# Patient Record
Sex: MALE | Race: WHITE | URBAN - METROPOLITAN AREA
[De-identification: names, ages, dates, MRNs, and addresses within clinical notes are randomized per-mention and may not be internally consistent; named-entity substitution may affect disease eponyms.]

---

## 2017-11-11 ENCOUNTER — OFFICE VISIT (OUTPATIENT)
Dept: URGENT CARE | Facility: URGENT CARE | Age: 72
End: 2017-11-11
Payer: MEDICARE

## 2017-11-11 VITALS
HEART RATE: 60 BPM | SYSTOLIC BLOOD PRESSURE: 110 MMHG | WEIGHT: 164 LBS | TEMPERATURE: 98.6 F | DIASTOLIC BLOOD PRESSURE: 65 MMHG

## 2017-11-11 DIAGNOSIS — H61.23 BILATERAL IMPACTED CERUMEN: Primary | ICD-10-CM

## 2017-11-11 PROBLEM — R56.9: Status: ACTIVE | Noted: 2017-11-11

## 2017-11-11 PROCEDURE — 99203 OFFICE O/P NEW LOW 30 MIN: CPT | Performed by: PHYSICIAN ASSISTANT

## 2017-11-11 RX ORDER — TRIAMCINOLONE ACETONIDE 1 MG/G
CREAM TOPICAL 2 TIMES DAILY
COMMUNITY

## 2017-11-11 RX ORDER — HYDROXYZINE HYDROCHLORIDE 25 MG/1
25 TABLET, FILM COATED ORAL 3 TIMES DAILY PRN
COMMUNITY

## 2017-11-11 RX ORDER — LORATADINE 10 MG/1
10 TABLET ORAL DAILY
COMMUNITY

## 2017-11-11 RX ORDER — OMEPRAZOLE/SODIUM BICARBONATE 20MG-1.1G
20 CAPSULE ORAL DAILY
COMMUNITY

## 2017-11-11 ASSESSMENT — ENCOUNTER SYMPTOMS
RESPIRATORY NEGATIVE: 1
WEIGHT LOSS: 0
GASTROINTESTINAL NEGATIVE: 1
COUGH: 0
PALPITATIONS: 0
HEMOPTYSIS: 0
DIAPHORESIS: 0
FEVER: 0
CONSTITUTIONAL NEGATIVE: 1
EYE PAIN: 0
CARDIOVASCULAR NEGATIVE: 1

## 2017-11-11 NOTE — PROGRESS NOTES
SUBJECTIVE:                                                       HPI  Alexi Becker is a 72 year old male who presents today with plugged R>L, ears since yesterday.  Also accompanied by sister today as well who provides much of the history.  Has noticed decreased hearing along with plugged sensation in the ears, R>L since yesterday.  No pain, drainage, or swelling.  No recent URI or fevers.  No recent swimming.  No HA, dizziness or ringing in the ears.  They have not tried much for his ears.  Sister reports hx of earwax buildup.  No foreign body insertion.     Reviewed PMH.  Patient Active Problem List   Diagnosis     Bilateral impacted cerumen     Convulsion disorder (H)       Current Outpatient Prescriptions   Medication Sig Dispense Refill     hydrOXYzine (ATARAX) 25 MG tablet Take 25 mg by mouth 3 times daily as needed for itching       loratadine (CLARITIN) 10 MG tablet Take 10 mg by mouth daily       MELATONIN PO Take 1 mg by mouth At Bedtime       omeprazole-sodium bicarbonate  MG CAPS per capsule Take 20 mg by mouth daily       triamcinolone (KENALOG) 0.1 % cream Apply topically 2 times daily       carbamide peroxide (DEBROX) 6.5 % otic solution 5 drops 2 times daily       LevETIRAcetam (KEPPRA PO) Take 1,000 mg by mouth daily       No Known Allergies    Review of Systems   Constitutional: Negative.  Negative for diaphoresis, fever and weight loss.   HENT: Positive for hearing loss. Negative for ear discharge and ear pain.    Eyes: Negative for pain.   Respiratory: Negative.  Negative for cough and hemoptysis.    Cardiovascular: Negative.  Negative for chest pain and palpitations.   Gastrointestinal: Negative.    Skin: Negative.    Endo/Heme/Allergies: Negative for environmental allergies.   All other systems reviewed and are negative.      /65  Pulse 60  Temp 98.6  F (37  C) (Oral)  Wt 164 lb (74.4 kg)  Physical Exam   Constitutional: He is oriented to person, place, and time and  well-developed, well-nourished, and in no distress. No distress.   HENT:   Head: Normocephalic and atraumatic.   Right Ear: Hearing, external ear and ear canal normal.   Left Ear: Hearing, external ear and ear canal normal.   Nose: Nose normal.   Mouth/Throat: Uvula is midline, oropharynx is clear and moist and mucous membranes are normal. No oropharyngeal exudate or posterior oropharyngeal erythema.   Bilateral cerumen impaction.  TMs are intact without any erythema or bulging bilaterally after successful ear lavage.  Airway is patent.   Eyes: Conjunctivae and EOM are normal. Pupils are equal, round, and reactive to light. No scleral icterus.   Neck: Normal range of motion. Neck supple. No thyromegaly present.   Cardiovascular: Normal rate, regular rhythm, normal heart sounds and intact distal pulses.  Exam reveals no gallop and no friction rub.    No murmur heard.  Pulmonary/Chest: Effort normal and breath sounds normal. No respiratory distress. He has no wheezes. He has no rales.   Lymphadenopathy:     He has no cervical adenopathy.   Neurological: He is alert and oriented to person, place, and time.   Skin: Skin is warm and dry. No rash noted.   Psychiatric: Mood and affect normal.   Nursing note and vitals reviewed.        Assessment/Plan:  Bilateral impacted cerumen:  This was successfully irrigated in clinic with good improvement.  No evidence of infection at this time.  Recommend OTC debrox for earwax buildup.  Avoid foreign body insertion.  Tylenol/ibuprofen as needed for pain.  Recheck in clinic if symptoms worsen or if symptoms do not improve.  -     REMOVE IMPACTED CERUMEN          Angie See IRA Amanda

## 2017-11-11 NOTE — MR AVS SNAPSHOT
"              After Visit Summary   2017    Alexi Becker    MRN: [id number]           Patient Information     Date Of Birth          1945        Visit Information        Provider Department      2017 12:35 PM Angie Amanda PA-C [facility]        Today's Diagnoses     Bilateral impacted cerumen    -  1       Follow-ups after your visit        Who to contact     If you have questions or need follow up information about today's clinic visit or your schedule please contact [facility] directly at [phone].  Normal or non-critical lab and imaging results will be communicated to you by [employer], letter or phone within 4 business days after the clinic has received the results. If you do not hear from us within 7 days, please contact the clinic through [employer] or phone. If you have a critical or abnormal lab result, we will notify you by phone as soon as possible.  Submit refill requests through [employer] or call your pharmacy and they will forward the refill request to us. Please allow 3 business days for your refill to be completed.          Additional Information About Your Visit        MyChart Information     [employer] lets you send messages to your doctor, view your test results, renew your prescriptions, schedule appointments and more. To sign up, go to [email or web address] . Click on \"Log in\" on the left side of the screen, which will take you to the Welcome page. Then click on \"Sign up Now\" on the right side of the page.     You will be asked to enter the access code listed below, as well as some personal information. Please follow the directions to create your username and password.     Your access code is: [email or web address]  Expires: 2018  1:23 PM     Your access code will  in 90 days. If you need help or a new code, please call your [facility] or [phone].        Care EveryWhere ID     This is your Care EveryWhere ID. This could be used by other " organizations to access your [facility] medical records  [id number]        Your Vitals Were     Pulse Temperature                60 98.6  F (37  C) (Oral)           Blood Pressure from Last 3 Encounters:   11/11/17 110/65    Weight from Last 3 Encounters:   11/11/17 164 lb (74.4 kg)              We Performed the Following     REMOVE IMPACTED CERUMEN        Primary Care Provider Office Phone # Fax #    [facility]andra [facility] [phone] [facility] [address]        Equal Access to Services     ROSALINA GRIER : Hadii aad ku hadasho Soomaali, waaxda luqadaha, qaybta kaalmada adeegyada, waxay rocioin hayguille jon . So [facility] [phone].    ATENCIÓN: Si habla español, tiene a mcdonnell disposición servicios gratuitos de asistencia lingüística. Llame al [phone].    We comply with applicable federal civil rights laws and [place] laws. We do not discriminate on the basis of race, color, national origin, age, disability, sex, sexual orientation, or gender identity.            Thank you!     Thank you for choosing [facility]  for your care. Our goal is always to provide you with excellent care. Hearing back from our patients is one way we can continue to improve our services. Please take a few minutes to complete the written survey that you may receive in the mail after your visit with us. Thank you!             Your Updated Medication List - Protect others around you: Learn how to safely use, store and throw away your medicines at www.disposemymeds.org.          This list is accurate as of: 11/11/17  1:23 PM.  Always use your most recent med list.                   Brand Name Dispense Instructions for use Diagnosis    carbamide peroxide 6.5 % otic solution    DEBROX     5 drops 2 times daily        hydrOXYzine 25 MG tablet    ATARAX     Take 25 mg by mouth 3 times daily as needed for itching        KEPPRA PO      Take 1,000 mg by mouth daily         loratadine 10 MG tablet    CLARITIN     Take 10 mg by mouth daily        MELATONIN PO      Take 1 mg by mouth At Bedtime        omeprazole-sodium bicarbonate  MG Caps per capsule      Take 20 mg by mouth daily        triamcinolone 0.1 % cream    KENALOG     Apply topically 2 times daily

## 2017-11-11 NOTE — NURSING NOTE
Chief Complaint   Patient presents with     Ear Problem     Right ear plugged        Initial /65  Pulse 60  Temp 98.6  F (37  C) (Oral)  Wt 164 lb (74.4 kg) There is no height or weight on file to calculate BMI..  BP completed using cuff size: TAMEKA Arora

## 2019-05-14 ENCOUNTER — OFFICE VISIT (OUTPATIENT)
Dept: PODIATRY | Facility: CLINIC | Age: 74
End: 2019-05-14
Payer: MEDICARE

## 2019-05-14 VITALS — HEART RATE: 66 BPM | DIASTOLIC BLOOD PRESSURE: 72 MMHG | WEIGHT: 200 LBS | SYSTOLIC BLOOD PRESSURE: 112 MMHG

## 2019-05-14 DIAGNOSIS — M21.6X1 PRONATION OF BOTH FEET: Primary | ICD-10-CM

## 2019-05-14 DIAGNOSIS — M79.672 PAIN IN BOTH FEET: ICD-10-CM

## 2019-05-14 DIAGNOSIS — B35.1 ONYCHOMYCOSIS: ICD-10-CM

## 2019-05-14 DIAGNOSIS — M79.671 PAIN IN BOTH FEET: ICD-10-CM

## 2019-05-14 DIAGNOSIS — M21.6X2 PRONATION OF BOTH FEET: Primary | ICD-10-CM

## 2019-05-14 PROBLEM — R76.11 PPD POSITIVE: Status: ACTIVE | Noted: 2019-05-14

## 2019-05-14 PROBLEM — H91.90 DEAFNESS: Status: ACTIVE | Noted: 2019-05-14

## 2019-05-14 PROBLEM — B19.10 HEPATITIS B: Status: ACTIVE | Noted: 2019-05-14

## 2019-05-14 PROBLEM — L28.0 NEURODERMATITIS: Status: ACTIVE | Noted: 2019-05-14

## 2019-05-14 PROBLEM — M81.0 OSTEOPOROSIS: Status: ACTIVE | Noted: 2019-05-14

## 2019-05-14 PROBLEM — F79 MENTAL RETARDATION: Status: ACTIVE | Noted: 2019-05-14

## 2019-05-14 PROBLEM — K21.9 GASTROESOPHAGEAL REFLUX: Status: ACTIVE | Noted: 2019-05-14

## 2019-05-14 PROBLEM — N40.0 BENIGN PROSTATIC HYPERPLASIA: Status: ACTIVE | Noted: 2019-05-14

## 2019-05-14 PROBLEM — J31.0 RHINITIS: Status: ACTIVE | Noted: 2019-05-14

## 2019-05-14 PROCEDURE — 99203 OFFICE O/P NEW LOW 30 MIN: CPT | Performed by: PODIATRIST

## 2019-05-14 RX ORDER — DOCUSATE SODIUM 100 MG/1
CAPSULE, LIQUID FILLED ORAL
Refills: 3 | COMMUNITY
Start: 2019-05-08

## 2019-05-14 RX ORDER — LAMOTRIGINE 150 MG/1
150 TABLET ORAL 2 TIMES DAILY
COMMUNITY

## 2019-05-14 NOTE — PATIENT INSTRUCTIONS
We wish you continued good healing. If you have any questions or concerns, please do not hesitate to contact us at [phone]    Please remember to call and schedule a follow up appointment if one was recommended at your earliest convenience.   PODIATRY CLINIC HOURS  TELEPHONE NUMBER    Dr. Josr Escobar D.P.M [facility]    Clinics:  [facility]    [facility]   Tuesday 1PM-6PM  [place]  Wednesday 7AM-2PM  [facility]  Thursday 10AM-6PM  [place]  Friday 7AM-3PM  [place]  Specialty schedulers:   [phone] to make an appointment with any Specialty Provider.        Urgent Care locations:    [facility] Monday-Friday 5 pm - 9 pm. Saturday-Sunday 9 am -5pm    Monday-Friday 11 am - 9 pm Saturday 9 am - 5 pm     Monday-Sunday 12 noon-8PM [phone]     If you need a medication refill, please contact us you may need lab work and/or a follow up visit prior to your refill (i.e. Antifungal medications).    [employer] (secure e-mail communication and access to your chart) to send a message or to make an appointment.    If MRI needed please call [place] [facility] at [phone]        Weight management plan: Patient was referred to their PCP to discuss a diet and exercise plan.

## 2019-05-15 NOTE — PROGRESS NOTES
S:  Complains of bilateral foot pain.  Points to medial arch.  Has had this for years.  Describes it as a burning pain.  Aggrevated by activity and relieved by rest.  Has had orthotics in the past and would like a new pain.  Also thick nails bilateral and wondering what this is from.  Denies numbness, erythema, edema, drainage.  Patient mentally handicapped and just moved in with his sister.       ROS:  A 10-point review of systems was performed and is positive for that noted in the HPI and as seen above.  All other areas are negative.        No Known Allergies    Current Outpatient Medications   Medication Sig Dispense Refill     docusate sodium (COLACE) 100 MG capsule   3     hydrOXYzine (ATARAX) 25 MG tablet Take 25 mg by mouth 3 times daily as needed for itching       lamoTRIgine (LAMICTAL) 150 MG tablet Take 150 mg by mouth 2 times daily       LevETIRAcetam (KEPPRA PO) Take 1,000 mg by mouth daily       loratadine (CLARITIN) 10 MG tablet Take 10 mg by mouth daily       magnesium hydroxide (MILK OF MAGNESIA) 400 MG/5ML suspension Take 30 mLs by mouth       omeprazole-sodium bicarbonate  MG CAPS per capsule Take 20 mg by mouth daily       order for DME Helmet, as needed with activity.       OYSTER SHELL CALCIUM PLUS D 500-125 MG-UNIT TABS   11     triamcinolone (KENALOG) 0.1 % cream Apply topically 2 times daily       carbamide peroxide (DEBROX) 6.5 % otic solution 5 drops 2 times daily       MELATONIN PO Take 1 mg by mouth At Bedtime         Patient Active Problem List   Diagnosis     Bilateral impacted cerumen     Convulsion disorder (H)     Astigmatism     Benign prostatic hyperplasia     Deafness     Gastroesophageal reflux     Hepatitis B     Mental retardation     Neurodermatitis     Osteoporosis     PPD positive     Presbyopia     Rhinitis       No past medical history on file.    No past surgical history on file.    No family history on file.    Social History     Tobacco Use     Smoking status:  Never Smoker     Smokeless tobacco: Never Used   Substance Use Topics     Alcohol use: No         Exam:    Vitals: /72   Pulse 66   Wt 90.7 kg (200 lb)   BMI: There is no height or weight on file to calculate BMI.  Height: Data Unavailable    Constitutional/ general:  Pt is in no apparent distress, appears well-nourished.  Cooperative with history and physical exam.   Seen with sister.    Psych:  The patient answered questions appropriately.  Normal affect.  Seems to have reasonable expectations, in terms of treatment.     Eyes:  Visual scanning/ tracking without deficit.     Ears:  Response to auditory stimuli is normal.  Auricles in proper alignment.     Lymphatic:  Popliteal lymph nodes not enlarged.     Lungs:  Non labored breathing, non labored speech. No cough.  No audible wheezing. Even, quiet breathing.       Vascular:  positive pedal pulses bilaterally for both the DP and PT arteries.  CFT < 3 sec.  positive ankle edema.  positive pedal hair growth.    Neuro:  Alert and oriented x 3. Coordinated gait.  Light touch sensation is intact to the L4, L5, S1 distributions. No obvious deficits.  No evidence of neurological-based weakness, spasticity, or contracture in the lower extremities.      Derm: Normal texture and turgor.  No erythema, ecchymosis, or cyanosis.  Bilateral 1/2 nails mycotic and halluces quite thick      Musculoskeletal:    Lower extremity muscle strength is normal.  Patient is ambulatory without an assistive device or brace.  No gross deformities.  Normal ROM all fore foot and rearfoot joints.  No equinus.  With weightbearing patient has bilateral pronation.  No pain with palpation or ROM.  No pain with stressing any muscle compartments.  Good calcaneal iversion with foot flexion.  no erythema edema or ecchymosis or masses noted.    A:  Pronation causing pain       Onychomycosis     P:   RX for custom orthotics.  Discussed importance of wearing these in a good shoe at all times to  prevent future problems.  Discussed good house shoes at all times until resolved.  Avoid activities that bother this.  Discussed cause of thisck nails.  Discussed with patient that medicare will not pay for this service and that I do not do routine foot care in my practice unless patient at significant risk of limb loss.  Will refer to foot care service/nurse.       RETURN TO CLINIC PRN.    Josr Escobar DPM, FACFAS

## 2021-05-29 ENCOUNTER — RECORDS - HEALTHEAST (OUTPATIENT)
Dept: ADMINISTRATIVE | Facility: CLINIC | Age: 76
End: 2021-05-29

## 2021-06-01 ENCOUNTER — RECORDS - HEALTHEAST (OUTPATIENT)
Dept: ADMINISTRATIVE | Facility: CLINIC | Age: 76
End: 2021-06-01

## 2021-06-03 ENCOUNTER — RECORDS - HEALTHEAST (OUTPATIENT)
Dept: ADMINISTRATIVE | Facility: CLINIC | Age: 76
End: 2021-06-03